# Patient Record
Sex: FEMALE | Race: WHITE | NOT HISPANIC OR LATINO | Employment: OTHER | URBAN - METROPOLITAN AREA
[De-identification: names, ages, dates, MRNs, and addresses within clinical notes are randomized per-mention and may not be internally consistent; named-entity substitution may affect disease eponyms.]

---

## 2019-06-10 ENCOUNTER — TRANSFERRED RECORDS (OUTPATIENT)
Dept: HEALTH INFORMATION MANAGEMENT | Facility: CLINIC | Age: 74
End: 2019-06-10

## 2019-11-09 ENCOUNTER — HEALTH MAINTENANCE LETTER (OUTPATIENT)
Age: 74
End: 2019-11-09

## 2020-02-23 ENCOUNTER — HEALTH MAINTENANCE LETTER (OUTPATIENT)
Age: 75
End: 2020-02-23

## 2020-12-06 ENCOUNTER — HEALTH MAINTENANCE LETTER (OUTPATIENT)
Age: 75
End: 2020-12-06

## 2021-04-11 ENCOUNTER — HEALTH MAINTENANCE LETTER (OUTPATIENT)
Age: 76
End: 2021-04-11

## 2021-09-26 ENCOUNTER — HEALTH MAINTENANCE LETTER (OUTPATIENT)
Age: 76
End: 2021-09-26

## 2022-05-07 ENCOUNTER — HEALTH MAINTENANCE LETTER (OUTPATIENT)
Age: 77
End: 2022-05-07

## 2022-08-08 ENCOUNTER — TRANSFERRED RECORDS (OUTPATIENT)
Dept: HEALTH INFORMATION MANAGEMENT | Facility: CLINIC | Age: 77
End: 2022-08-08

## 2023-04-22 ENCOUNTER — HEALTH MAINTENANCE LETTER (OUTPATIENT)
Age: 78
End: 2023-04-22

## 2024-01-09 ENCOUNTER — OFFICE VISIT (OUTPATIENT)
Dept: INTERNAL MEDICINE | Facility: CLINIC | Age: 79
End: 2024-01-09
Payer: COMMERCIAL

## 2024-01-09 VITALS
HEIGHT: 65 IN | WEIGHT: 170 LBS | OXYGEN SATURATION: 96 % | HEART RATE: 74 BPM | TEMPERATURE: 98.2 F | SYSTOLIC BLOOD PRESSURE: 118 MMHG | BODY MASS INDEX: 28.32 KG/M2 | RESPIRATION RATE: 16 BRPM | DIASTOLIC BLOOD PRESSURE: 66 MMHG

## 2024-01-09 DIAGNOSIS — J45.20 MILD INTERMITTENT ASTHMA, UNSPECIFIED WHETHER COMPLICATED: Primary | ICD-10-CM

## 2024-01-09 DIAGNOSIS — K21.00 GASTROESOPHAGEAL REFLUX DISEASE WITH ESOPHAGITIS WITHOUT HEMORRHAGE: ICD-10-CM

## 2024-01-09 DIAGNOSIS — E78.5 HYPERLIPIDEMIA LDL GOAL <130: ICD-10-CM

## 2024-01-09 PROCEDURE — 99203 OFFICE O/P NEW LOW 30 MIN: CPT | Performed by: INTERNAL MEDICINE

## 2024-01-09 RX ORDER — FLUTICASONE PROPIONATE 110 UG/1
1 AEROSOL, METERED RESPIRATORY (INHALATION) 2 TIMES DAILY
COMMUNITY
Start: 2024-01-09

## 2024-01-09 RX ORDER — ROSUVASTATIN CALCIUM 10 MG/1
10 TABLET, COATED ORAL DAILY
COMMUNITY
Start: 2024-01-09 | End: 2024-08-21

## 2024-01-09 ASSESSMENT — ASTHMA QUESTIONNAIRES
QUESTION_4 LAST FOUR WEEKS HOW OFTEN HAVE YOU USED YOUR RESCUE INHALER OR NEBULIZER MEDICATION (SUCH AS ALBUTEROL): NOT AT ALL
QUESTION_3 LAST FOUR WEEKS HOW OFTEN DID YOUR ASTHMA SYMPTOMS (WHEEZING, COUGHING, SHORTNESS OF BREATH, CHEST TIGHTNESS OR PAIN) WAKE YOU UP AT NIGHT OR EARLIER THAN USUAL IN THE MORNING: NOT AT ALL
QUESTION_2 LAST FOUR WEEKS HOW OFTEN HAVE YOU HAD SHORTNESS OF BREATH: NOT AT ALL
QUESTION_1 LAST FOUR WEEKS HOW MUCH OF THE TIME DID YOUR ASTHMA KEEP YOU FROM GETTING AS MUCH DONE AT WORK, SCHOOL OR AT HOME: NONE OF THE TIME
ACT_TOTALSCORE: 24
ACT_TOTALSCORE: 24
QUESTION_5 LAST FOUR WEEKS HOW WOULD YOU RATE YOUR ASTHMA CONTROL: WELL CONTROLLED

## 2024-01-09 NOTE — PROGRESS NOTES
"  Assessment & Plan   Problem List Items Addressed This Visit       Asthma, mild intermittent - Primary    Relevant Medications    fluticasone (FLOVENT HFA) 110 MCG/ACT inhaler    Hyperlipidemia LDL goal <130    Relevant Medications    rosuvastatin (CRESTOR) 10 MG tablet     Other Visit Diagnoses       Gastroesophageal reflux disease with esophagitis without hemorrhage        Relevant Medications    fluticasone (FLOVENT HFA) 110 MCG/ACT inhaler    omeprazole (PRILOSEC) 20 MG DR capsule           Patient is here to establish care.  She used to live in [place] but her and her  moved to an apartment in [place] to be close to their children.  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    Has a history of chronic back pain that got better with an ablation on the right side.    Reflux disease she is on Prilosec but we discussed trying to go off of this due to the risks.    Osteoporosis she has been on Fosamax and is now on holiday.  Just stopped this in 2022.    Hyperlipidemia on Crestor doing well    Mild intermittent asthma on Flovent.  She says she normally gets bronchitis once a year and needs prednisone.  She can try increase her Flovent if she starts to get sick and then see us if she needs prednisone.    Immunizations are up-to-date    No longer doing mammograms or colonoscopies at the age of 78.    Follow-up for an annual wellness exam in September.           BMI:   Estimated body mass index is 28.07 kg/m  as calculated from the following:    Height as of this encounter: 1.657 m (5' 5.25\").    Weight as of this encounter: 77.1 kg (170 lb).           Amarjit Ball MD  [facility]    Mary Lou Phelps is a 78 year old, presenting for the following health issues:  Establish Care      1/9/2024    10:49 AM   Additional Questions   Roomed by Lacey     History of Present Illness       Reason for visit:  Establish care    She eats 0-1 " "servings of fruits and vegetables daily.She consumes 1 sweetened beverage(s) daily.She exercises with enough effort to increase her heart rate 10 to 19 minutes per day.  She exercises with enough effort to increase her heart rate 3 or less days per week.   She is taking medications regularly.     Moved to town August 1st, sold their house in [facility] by the [employer],   Just retired from [employer], got bored so now volunteering here.      Health is pretty good,     Bronchitis, and intermittent Asthma, been good lately.  Uses Flovent daily and no need for rescue    GERD and on Omeprazole,     Osteoporosis in her hip.  Had fosamax for 3 years and no change. Stopped 2022, holiday period.     Chronic back pains and had ablation. Much improved, \"miracle\" per her.       Review of Systems         Objective    /66   Pulse 74   Temp 98.2  F (36.8  C) (Temporal)   Resp 16   Ht 1.657 m (5' 5.25\")   Wt 77.1 kg (170 lb)   SpO2 96%   BMI 28.07 kg/m    Body mass index is 28.07 kg/m .  Physical Exam   GENERAL: healthy, alert and no distress  NECK: no adenopathy, no asymmetry, masses, or scars and thyroid normal to palpation  RESP: lungs clear to auscultation - no rales, rhonchi or wheezes  CV: regular rate and rhythm, normal S1 S2, no S3 or S4, no murmur, click or rub, no peripheral edema and peripheral pulses strong  ABDOMEN: soft, nontender, no hepatosplenomegaly, no masses and bowel sounds normal  MS: no gross musculoskeletal defects noted, no edema                      "

## 2024-08-20 SDOH — HEALTH STABILITY: PHYSICAL HEALTH: ON AVERAGE, HOW MANY DAYS PER WEEK DO YOU ENGAGE IN MODERATE TO STRENUOUS EXERCISE (LIKE A BRISK WALK)?: 2 DAYS

## 2024-08-20 SDOH — HEALTH STABILITY: PHYSICAL HEALTH: ON AVERAGE, HOW MANY MINUTES DO YOU ENGAGE IN EXERCISE AT THIS LEVEL?: 30 MIN

## 2024-08-20 ASSESSMENT — ASTHMA QUESTIONNAIRES
ACT_TOTALSCORE: 24
QUESTION_4 LAST FOUR WEEKS HOW OFTEN HAVE YOU USED YOUR RESCUE INHALER OR NEBULIZER MEDICATION (SUCH AS ALBUTEROL): NOT AT ALL
QUESTION_5 LAST FOUR WEEKS HOW WOULD YOU RATE YOUR ASTHMA CONTROL: WELL CONTROLLED
ACT_TOTALSCORE: 24
QUESTION_3 LAST FOUR WEEKS HOW OFTEN DID YOUR ASTHMA SYMPTOMS (WHEEZING, COUGHING, SHORTNESS OF BREATH, CHEST TIGHTNESS OR PAIN) WAKE YOU UP AT NIGHT OR EARLIER THAN USUAL IN THE MORNING: NOT AT ALL
QUESTION_2 LAST FOUR WEEKS HOW OFTEN HAVE YOU HAD SHORTNESS OF BREATH: NOT AT ALL
QUESTION_1 LAST FOUR WEEKS HOW MUCH OF THE TIME DID YOUR ASTHMA KEEP YOU FROM GETTING AS MUCH DONE AT WORK, SCHOOL OR AT HOME: NONE OF THE TIME

## 2024-08-20 ASSESSMENT — SOCIAL DETERMINANTS OF HEALTH (SDOH): HOW OFTEN DO YOU GET TOGETHER WITH FRIENDS OR RELATIVES?: ONCE A WEEK

## 2024-08-21 ENCOUNTER — OFFICE VISIT (OUTPATIENT)
Dept: INTERNAL MEDICINE | Facility: CLINIC | Age: 79
End: 2024-08-21
Payer: COMMERCIAL

## 2024-08-21 VITALS
HEART RATE: 64 BPM | RESPIRATION RATE: 12 BRPM | SYSTOLIC BLOOD PRESSURE: 120 MMHG | HEIGHT: 65 IN | WEIGHT: 169.3 LBS | BODY MASS INDEX: 28.21 KG/M2 | TEMPERATURE: 96.9 F | DIASTOLIC BLOOD PRESSURE: 70 MMHG | OXYGEN SATURATION: 96 %

## 2024-08-21 DIAGNOSIS — Z12.31 ENCOUNTER FOR SCREENING MAMMOGRAM FOR BREAST CANCER: ICD-10-CM

## 2024-08-21 DIAGNOSIS — E78.5 HYPERLIPIDEMIA LDL GOAL <130: ICD-10-CM

## 2024-08-21 DIAGNOSIS — J45.20 MILD INTERMITTENT ASTHMA, UNSPECIFIED WHETHER COMPLICATED: ICD-10-CM

## 2024-08-21 DIAGNOSIS — Z00.00 MEDICARE ANNUAL WELLNESS VISIT, SUBSEQUENT: Primary | ICD-10-CM

## 2024-08-21 DIAGNOSIS — K21.00 GASTROESOPHAGEAL REFLUX DISEASE WITH ESOPHAGITIS WITHOUT HEMORRHAGE: ICD-10-CM

## 2024-08-21 DIAGNOSIS — G47.00 INSOMNIA, UNSPECIFIED TYPE: ICD-10-CM

## 2024-08-21 LAB
ALBUMIN SERPL BCG-MCNC: 4.4 G/DL (ref 3.5–5.2)
ALP SERPL-CCNC: 116 U/L (ref 40–150)
ALT SERPL W P-5'-P-CCNC: 19 U/L (ref 0–50)
ANION GAP SERPL CALCULATED.3IONS-SCNC: 10 MMOL/L (ref 7–15)
AST SERPL W P-5'-P-CCNC: 19 U/L (ref 0–45)
BILIRUB SERPL-MCNC: 0.6 MG/DL
BUN SERPL-MCNC: 15.5 MG/DL (ref 8–23)
CALCIUM SERPL-MCNC: 9.6 MG/DL (ref 8.8–10.4)
CHLORIDE SERPL-SCNC: 104 MMOL/L (ref 98–107)
CHOLEST SERPL-MCNC: 169 MG/DL
CREAT SERPL-MCNC: 0.77 MG/DL (ref 0.51–0.95)
EGFRCR SERPLBLD CKD-EPI 2021: 78 ML/MIN/1.73M2
FASTING STATUS PATIENT QL REPORTED: YES
FASTING STATUS PATIENT QL REPORTED: YES
GLUCOSE SERPL-MCNC: 90 MG/DL (ref 70–99)
HCO3 SERPL-SCNC: 26 MMOL/L (ref 22–29)
HDLC SERPL-MCNC: 56 MG/DL
LDLC SERPL CALC-MCNC: 86 MG/DL
NONHDLC SERPL-MCNC: 113 MG/DL
POTASSIUM SERPL-SCNC: 4.1 MMOL/L (ref 3.4–5.3)
PROT SERPL-MCNC: 6.9 G/DL (ref 6.4–8.3)
SODIUM SERPL-SCNC: 140 MMOL/L (ref 135–145)
TRIGL SERPL-MCNC: 135 MG/DL

## 2024-08-21 PROCEDURE — 36415 COLL VENOUS BLD VENIPUNCTURE: CPT | Performed by: INTERNAL MEDICINE

## 2024-08-21 PROCEDURE — 80053 COMPREHEN METABOLIC PANEL: CPT | Performed by: INTERNAL MEDICINE

## 2024-08-21 PROCEDURE — 99397 PER PM REEVAL EST PAT 65+ YR: CPT | Performed by: INTERNAL MEDICINE

## 2024-08-21 PROCEDURE — 99214 OFFICE O/P EST MOD 30 MIN: CPT | Mod: 25 | Performed by: INTERNAL MEDICINE

## 2024-08-21 PROCEDURE — 80061 LIPID PANEL: CPT | Performed by: INTERNAL MEDICINE

## 2024-08-21 RX ORDER — TRAZODONE HYDROCHLORIDE 50 MG/1
50 TABLET, FILM COATED ORAL AT BEDTIME
Qty: 90 TABLET | Refills: 3 | Status: SHIPPED | OUTPATIENT
Start: 2024-08-21

## 2024-08-21 RX ORDER — ROSUVASTATIN CALCIUM 10 MG/1
10 TABLET, COATED ORAL DAILY
Qty: 90 TABLET | Refills: 3 | Status: SHIPPED | OUTPATIENT
Start: 2024-08-21

## 2024-08-21 ASSESSMENT — PAIN SCALES - GENERAL: PAINLEVEL: NO PAIN (0)

## 2024-08-21 NOTE — PROGRESS NOTES
"Preventive Care Visit  [facility]  Amarjit Ball MD, Internal Medicine  Aug 21, 2024      Assessment & Plan   Problem List Items Addressed This Visit       Asthma, mild intermittent    Hyperlipidemia LDL goal <130    Relevant Medications    rosuvastatin (CRESTOR) 10 MG tablet    Other Relevant Orders    Comprehensive metabolic panel (BMP + Alb, Alk Phos, ALT, AST, Total. Bili, TP)    Lipid panel reflex to direct LDL Fasting     Other Visit Diagnoses       Medicare annual wellness visit, subsequent    -  Primary    Gastroesophageal reflux disease with esophagitis without hemorrhage        Relevant Medications    omeprazole (PRILOSEC) 20 MG DR capsule    Insomnia, unspecified type        Relevant Medications    traZODone (DESYREL) 50 MG tablet    Encounter for screening mammogram for breast cancer        Relevant Orders    MA Screen Bilateral w/Keyur           She is here for Medicare wellness exam.  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  No longer needing colonoscopies, we will order mammogram  She will get a flu shot COVID shot and shingles shot this fall.  Memory is good  Walking is good.    Other issues addressed reflux disease cause a little bit of a cough she will go back on omeprazole and a refill is done.  Hyperlipidemia will check lipids and order her Crestor for her     new issue is insomnia with some mind racing at night difficult to sleep we will try her on trazodone.  Discussed the medication, side effects and how to take it.    Asthma is stable not needing Flovent or albuterol but has them if needed when she gets sick.      Patient has been advised of split billing requirements and indicates understanding: Yes       BMI  Estimated body mass index is 28 kg/m  as calculated from the following:    Height as of this encounter: 1.656 m (5' 5.2\").    Weight as of this encounter: 76.8 kg (169 lb 4.8 oz). "       Counseling  Appropriate preventive services were addressed with this patient via screening, questionnaire, or discussion as appropriate for fall prevention, nutrition, physical activity, Tobacco-use cessation, social engagement, weight loss and cognition.  Checklist reviewing preventive services available has been given to the patient.  Reviewed patient's diet, addressing concerns and/or questions.   She is at risk for lack of exercise and has been provided with information to increase physical activity for the benefit of her well-being.     FUTURE APPOINTMENTS:       - Follow-up for annual visit or as needed      Subjective   Mattie is a 78 year old, presenting for the following:  Physical        8/21/2024     9:35 AM   Additional Questions   Roomed by Florentin MARLEY        Health Care Directive  Patient does not have a Health Care Directive or Living Will: Discussed advance care planning with patient; information given to patient to review.    HPI  Doing well, likes to get outside, volunteers.     Doing pretty well. Off omeprazole, no heartburn symptoms.  Has a cough again will restart it.    Stopped gabapentin after her back injection is better now.     Hard to fall asleep mind is racing at night.   with health issues, worries about him at night. Not getting 8 hours of sleep, average of 6.     Not wanting another colonoscopy, will do a mammogram    History of psoriasis, uses lotion,     Exercises with walking.         8/20/2024   General Health   How would you rate your overall physical health? Good   Feel stress (tense, anxious, or unable to sleep) To some extent      (!) STRESS CONCERN      8/20/2024   Nutrition   Diet: Regular (no restrictions)            8/20/2024   Exercise   Days per week of moderate/strenous exercise 2 days   Average minutes spent exercising at this level 30 min      (!) EXERCISE CONCERN      8/20/2024   Social Factors   Frequency of gathering with friends or relatives Once a week    Worry food won't last until get money to buy more No   Food not last or not have enough money for food? No   Do you have housing? (Housing is defined as stable permanent housing and does not include staying ouside in a car, in a tent, in an abandoned building, in an overnight shelter, or couch-surfing.) Yes   Are you worried about losing your housing? No   Lack of transportation? No   Unable to get utilities (heat,electricity)? No            8/20/2024   Fall Risk   Fallen 2 or more times in the past year? No   Trouble with walking or balance? No             8/20/2024   Activities of Daily Living- Home Safety   Needs help with the following daily activites None of the above   Safety concerns in the home None of the above            8/20/2024   Dental   Dentist two times every year? Yes            8/20/2024   Hearing Screening   Hearing concerns? None of the above            8/20/2024   Driving Risk Screening   Patient/family members have concerns about driving No            8/20/2024   General Alertness/Fatigue Screening   Have you been more tired than usual lately? No            8/20/2024   Urinary Incontinence Screening   Bothered by leaking urine in past 6 months No            8/20/2024   TB Screening   Were you born outside of the [place]? No          Today's PHQ-2 Score:       1/9/2024    10:44 AM   PHQ-2 ( 1999 Pf[employer])   Q1: Little interest or pleasure in doing things 0   Q2: Feeling down, depressed or hopeless 0   PHQ-2 Score 0   Q1: Little interest or pleasure in doing things Not at all   Q2: Feeling down, depressed or hopeless Not at all   PHQ-2 Score 0         8/20/2024   Substance Use   Alcohol more than 3/day or more than 7/wk No   Do you have a current opioid prescription? No   How severe/bad is pain from 1 to 10? 0/10 (No Pain)   Do you use any other substances recreationally? (!) OTHER        Social History     Tobacco Use    Smoking status: Never    Smokeless tobacco: Never   Substance Use Topics     Alcohol use: Yes     Comment: occ    Drug use: No      Mammogram Screening - After age 74- determine frequency with patient based on health status, life expectancy and patient goals  Reviewed and updated as needed this visit by Provider                    Lab work is in process  Current providers sharing in care for this patient include:  Patient Care Team:  Amarjit Ball MD as PCP - General (Internal Medicine)  Amarjit Ball MD as Assigned PCP    The following health maintenance items are reviewed in Epic and correct as of today:  Health Maintenance   Topic Date Due    GLUCOSE  Never done    RSV VACCINE (Pregnancy & 60+) (1 - 1-dose 60+ series) Never done    ASTHMA ACTION PLAN  11/03/2009    LIPID  10/08/2013    ZOSTER IMMUNIZATION (2 of 3) 06/01/2015    COVID-19 Vaccine (6 - 2023-24 season) 02/03/2024    MEDICARE ANNUAL WELLNESS VISIT  08/16/2024    INFLUENZA VACCINE (1) 09/01/2024    ANNUAL REVIEW OF HM ORDERS  01/09/2025    ASTHMA CONTROL TEST  02/21/2025    DEXA  06/22/2025    FALL RISK ASSESSMENT  08/21/2025    DTAP/TDAP/TD IMMUNIZATION (4 - Td or Tdap) 04/19/2027    ADVANCE CARE PLANNING  08/21/2029    PHQ-2 (once per calendar year)  Completed    Pneumococcal Vaccine: 65+ Years  Completed    HPV IMMUNIZATION  Aged Out    MENINGITIS IMMUNIZATION  Aged Out    RSV MONOCLONAL ANTIBODY  Aged Out    COLORECTAL CANCER SCREENING  Discontinued         Review of Systems  CONSTITUTIONAL: NEGATIVE for fever, chills, change in weight  INTEGUMENTARY/SKIN: NEGATIVE for worrisome rashes, moles or lesions  EYES: NEGATIVE for vision changes or irritation  ENT/MOUTH: NEGATIVE for ear, mouth and throat problems  RESP: NEGATIVE for significant cough or SOB  BREAST: NEGATIVE for masses, tenderness or discharge  CV: NEGATIVE for chest pain, palpitations or peripheral edema  GI: NEGATIVE for nausea, abdominal pain, heartburn, or change in bowel habits  : NEGATIVE for frequency, dysuria, or hematuria  MUSCULOSKELETAL: NEGATIVE  "for significant arthralgias or myalgia  NEURO: NEGATIVE for weakness, dizziness or paresthesias  ENDOCRINE: NEGATIVE for temperature intolerance, skin/hair changes  HEME: NEGATIVE for bleeding problems  PSYCHIATRIC: NEGATIVE for changes in mood or affect     Objective    Exam  /70   Pulse 64   Temp 96.9  F (36.1  C) (Temporal)   Resp 12   Ht 1.656 m (5' 5.2\")   Wt 76.8 kg (169 lb 4.8 oz)   SpO2 96%   BMI 28.00 kg/m     Estimated body mass index is 28 kg/m  as calculated from the following:    Height as of this encounter: 1.656 m (5' 5.2\").    Weight as of this encounter: 76.8 kg (169 lb 4.8 oz).    Physical Exam  GENERAL: alert and no distress  EYES: Eyes grossly normal to inspection, PERRL and conjunctivae and sclerae normal  HENT: ear canals and TM's normal, nose and mouth without ulcers or lesions  NECK: no adenopathy, no asymmetry, masses, or scars  RESP: lungs clear to auscultation - no rales, rhonchi or wheezes  CV: regular rate and rhythm, normal S1 S2, no S3 or S4, no murmur, click or rub, no peripheral edema  ABDOMEN: soft, nontender, no hepatosplenomegaly, no masses and bowel sounds normal  MS: no gross musculoskeletal defects noted, no edema  SKIN: no suspicious lesions or rashes  NEURO: Normal strength and tone, mentation intact and speech normal  PSYCH: mentation appears normal, affect normal/bright         8/21/2024   Mini Cog   Clock Draw Score 2 Normal   3 Item Recall 3 objects recalled   Mini Cog Total Score 5          Signed Electronically by: Amarjit Ball MD  "

## 2024-11-12 ENCOUNTER — HOSPITAL ENCOUNTER (OUTPATIENT)
Dept: MAMMOGRAPHY | Facility: CLINIC | Age: 79
Discharge: HOME | End: 2024-11-12
Attending: INTERNAL MEDICINE | Admitting: INTERNAL MEDICINE
Payer: MEDICARE

## 2024-11-12 DIAGNOSIS — Z12.31 ENCOUNTER FOR SCREENING MAMMOGRAM FOR BREAST CANCER: ICD-10-CM

## 2024-11-12 PROCEDURE — 77063 BREAST TOMOSYNTHESIS BI: CPT

## 2024-11-29 ENCOUNTER — HOSPITAL ENCOUNTER (OUTPATIENT)
Dept: GENERAL RADIOLOGY | Facility: CLINIC | Age: 79
Discharge: HOME | End: 2024-11-29
Attending: FAMILY MEDICINE | Admitting: FAMILY MEDICINE
Payer: MEDICARE

## 2024-11-29 ENCOUNTER — OFFICE VISIT (OUTPATIENT)
Dept: FAMILY MEDICINE | Facility: CLINIC | Age: 79
End: 2024-11-29
Payer: COMMERCIAL

## 2024-11-29 VITALS
TEMPERATURE: 97.2 F | HEIGHT: 66 IN | BODY MASS INDEX: 27.59 KG/M2 | DIASTOLIC BLOOD PRESSURE: 72 MMHG | HEART RATE: 64 BPM | WEIGHT: 171.7 LBS | OXYGEN SATURATION: 95 % | RESPIRATION RATE: 14 BRPM | SYSTOLIC BLOOD PRESSURE: 115 MMHG

## 2024-11-29 DIAGNOSIS — J40 BRONCHITIS: ICD-10-CM

## 2024-11-29 DIAGNOSIS — J45.41 MODERATE PERSISTENT ASTHMA WITH EXACERBATION: ICD-10-CM

## 2024-11-29 DIAGNOSIS — R05.1 ACUTE COUGH: ICD-10-CM

## 2024-11-29 DIAGNOSIS — R05.1 ACUTE COUGH: Primary | ICD-10-CM

## 2024-11-29 PROCEDURE — G2211 COMPLEX E/M VISIT ADD ON: HCPCS | Performed by: FAMILY MEDICINE

## 2024-11-29 PROCEDURE — 99214 OFFICE O/P EST MOD 30 MIN: CPT | Performed by: FAMILY MEDICINE

## 2024-11-29 PROCEDURE — 71046 X-RAY EXAM CHEST 2 VIEWS: CPT

## 2024-11-29 RX ORDER — PREDNISONE 20 MG/1
TABLET ORAL
Qty: 20 TABLET | Refills: 0 | Status: SHIPPED | OUTPATIENT
Start: 2024-11-29

## 2024-11-29 RX ORDER — AZITHROMYCIN 250 MG/1
TABLET, FILM COATED ORAL
Qty: 6 TABLET | Refills: 0 | Status: SHIPPED | OUTPATIENT
Start: 2024-11-29 | End: 2024-12-03

## 2024-11-29 ASSESSMENT — PAIN SCALES - GENERAL: PAINLEVEL_OUTOF10: NO PAIN (0)

## 2024-11-29 NOTE — PROGRESS NOTES
Assessment & Plan     Acute cough  Mattie Andrew is an 79-year-old female patient of Dr.Paul MARTIN Ball who presents to clinic today with 1 to 2-week history of upper respiratory symptoms.  Symptoms initially began in her upper chest.  She denies any nasal symptoms of sinus congestion, purulent rhinitis, ear pain or sore throat.  Symptoms began in the chest with a tickly productive cough of whitish sputum.  She has had no fever.  She does have a history of asthma for which she uses Flovent 1 puff twice daily and albuterol which she has been using every 4 hours.  She has occasional episodes of asthma exacerbations.  She has been using over-the-counter mucolytic's such as Mucinex and Robitussin.  She has not been using any decongestants.    She is up-to-date with all immunizations including COVID and influenza vaccine (and.  She did test for COVID at home and it was negative.    On exam she is a pleasant elderly female in no acute distress.  Her blood pressure is 115/72.  Her pulse is 64 and regular.  She is afebrile.  Respiratory rate is 14 and unlabored with oxygen saturations of 95% on room air.    HEENT exam: Normocephalic atraumatic.  Pupils are equally round reactive light accommodation.  TMs are clear bilaterally.  Nasopharynx is clear.  Sinuses nontender with palpation.  Oropharynx is clear without erythema.  Neck is supple without adenopathy.  Lungs shows occasional scattered rhonchi but no wheezing rales heard.  Cardiac exam is regular.  Abdomen is benign.    Chest x-ray:  Results for orders placed or performed during the hospital encounter of 11/29/24   XR Chest 2 Views     Status: None    Narrative    CHEST TWO VIEWS 11/29/2024 3:21 PM    HISTORY: Acute cough.    COMPARISON: None.    FINDINGS/    Impression    IMPRESSION: A subcentimeter increased density nodule in the right  upper lobe is suggestive of a pulmonary granuloma. If further  evaluation is desired, consider follow-up chest CT. No  airspace  consolidation, pneumothorax, or pleural fluid. Heart size and  pulmonary vasculature are normal appearing. No acute osseous  abnormality.         PETROS KEBEDE MD         SYSTEM ID:  [id number]         Assessment: 79-year-old female with with 1 to 2-week history of upper respiratory symptoms of cough productive of sputum and dyspnea.  She has a history of asthma for which she is on Flovent and albuterol inhaler.  No infiltrate noted on chest x-ray.  She does have a small which appears to be a calcified granuloma in the right upper lobe.  This will need follow-up.    Plan: Will start her on a prednisone burst and taper as well as azithromycin Z-Cornelius for 5 days.  Encourage plenty of fluids.  She will continue with her Flovent 1 puff twice daily and can increase her albuterol to 2 puffs every 4 hours and 2 hours as needed.  Like her to follow-up with her PCP in 1 to 2 weeks if symptoms have not improved.  - XR Chest 2 Views; Future    Bronchitis  Assessment: 79-year-old female with with 1 to 2-week history of upper respiratory symptoms of cough productive of sputum and dyspnea.  She has a history of asthma for which she is on Flovent and albuterol inhaler.  No infiltrate noted on chest x-ray.  She does have a small which appears to be a calcified granuloma in the right upper lobe.  This will need follow-up.    Plan: Will start her on a prednisone burst and taper as well as azithromycin Z-Cornelius for 5 days.  Encourage plenty of fluids.  She will continue with her Flovent 1 puff twice daily and can increase her albuterol to 2 puffs every 4 hours and 2 hours as needed.  Like her to follow-up with her PCP in 1 to 2 weeks if symptoms have not improved.  - azithromycin (ZITHROMAX) 250 MG tablet; Take 2 tablets (500 mg) by mouth daily for 1 day, THEN 1 tablet (250 mg) daily for 4 days.    Moderate persistent asthma with exacerbation  Assessment: 79-year-old female with with 1 to 2-week history of upper respiratory  symptoms of cough productive of sputum and dyspnea.  She has a history of asthma for which she is on Flovent and albuterol inhaler.  No infiltrate noted on chest x-ray.  She does have a small which appears to be a calcified granuloma in the right upper lobe.  This will need follow-up.    Plan: Will start her on a prednisone burst and taper as well as azithromycin Z-Cornelius for 5 days.  Encourage plenty of fluids.  She will continue with her Flovent 1 puff twice daily and can increase her albuterol to 2 puffs every 4 hours and 2 hours as needed.  Like her to follow-up with her PCP in 1 to 2 weeks if symptoms have not improved.  - predniSONE (DELTASONE) 20 MG tablet; Take 3 tabs by mouth daily x 3 days, then 2 tabs daily x 3 days, then 1 tab daily x 3 days, then 1/2 tab daily x 3 days.            FUTURE APPOINTMENTS:       - Follow-up for annual visit or as needed    Subjective   Mattie is a 79 year old, presenting for the following health issues:  Cold Symptoms        11/29/2024     2:43 PM   Additional Questions   Roomed by Fermín     History of Present Illness       Reason for visit:  Cold symptoms  Symptom onset:  1-2 weeks ago  Symptoms include:  Burning sensation in chest, cough, shortness of breath, fatigue  Symptom intensity:  Moderate  Symptom progression:  Worsening  Had these symptoms before:  Yes   She is taking medications regularly.         Patient Active Problem List   Diagnosis    Asthma, mild intermittent    Osteoarthritis    Hyperlipidemia LDL goal <130    Overweight (BMI 25.0-29.9)     Current Outpatient Medications   Medication Sig Dispense Refill    albuterol (PROAIR HFA) 108 (90 BASE) MCG/ACT inhaler Inhale 1-2 puffs into the lungs every 4 hours as needed for shortness of breath / dyspnea. 1 Inhaler 3    aspirin 81 MG EC tablet Take 1 tablet by mouth daily.      fluticasone (FLOVENT HFA) 110 MCG/ACT inhaler Inhale 1 puff into the lungs 2 times daily      omeprazole (PRILOSEC) 20 MG DR capsule Take 1  "capsule (20 mg) by mouth daily. 90 capsule 3    rosuvastatin (CRESTOR) 10 MG tablet Take 1 tablet (10 mg) by mouth daily. 90 tablet 3    traZODone (DESYREL) 50 MG tablet Take 1 tablet (50 mg) by mouth at bedtime. 90 tablet 3       Asthma Follow-Up    Was ACT completed today?  Yes        8/20/2024     2:48 PM   ACT Total Scores   ACT TOTAL SCORE (Goal Greater than or Equal to 20) 24   In the past 12 months, how many times did you visit the emergency room for your asthma without being admitted to the hospital? 0    In the past 12 months, how many times were you hospitalized overnight because of your asthma? 0        Patient-reported              Review of Systems  CONSTITUTIONAL: NEGATIVE for fever, chills, change in weight  ENT/MOUTH: NEGATIVE for ear, mouth and throat problems  RESP:POSITIVE for cough-productive, Hx asthma, sputum white, and wheezing and NEGATIVE for hemoptysis, pleurisy, and SOB/dyspnea  CV: NEGATIVE for chest pain, palpitations or peripheral edema  ROS otherwise negative      Objective    /72 (BP Location: Left arm, Patient Position: Sitting, Cuff Size: Adult Regular)   Pulse 64   Temp 97.2  F (36.2  C) (Temporal)   Resp 14   Ht 1.68 m (5' 6.14\")   Wt 77.9 kg (171 lb 11.2 oz)   SpO2 95%   BMI 27.59 kg/m    Body mass index is 27.59 kg/m .  Physical Exam   GENERAL: alert and no distress  EYES: Eyes grossly normal to inspection, PERRL and conjunctivae and sclerae normal  HENT: ear canals and TM's normal, nose and mouth without ulcers or lesions  NECK: no adenopathy, no asymmetry, masses, or scars  RESP: lungs clear to auscultation - no rales, rhonchi or wheezes  CV: regular rate and rhythm, normal S1 S2, no S3 or S4, no murmur, click or rub, no peripheral edema  ABDOMEN: soft, nontender, no hepatosplenomegaly, no masses and bowel sounds normal  MS: no gross musculoskeletal defects noted, no edema    XR Chest 2 Views    Result Date: 11/29/2024  CHEST TWO VIEWS 11/29/2024 3:21 PM HISTORY: " Acute cough. COMPARISON: None. FINDINGS/    IMPRESSION: A subcentimeter increased density nodule in the right upper lobe is suggestive of a pulmonary granuloma. If further evaluation is desired, consider follow-up chest CT. No airspace consolidation, pneumothorax, or pleural fluid. Heart size and pulmonary vasculature are normal appearing. No acute osseous abnormality.  PETROS KEBEDE MD   SYSTEM ID:  [id number]         Signed Electronically by: Mack Loco MD

## 2024-12-09 ENCOUNTER — OFFICE VISIT (OUTPATIENT)
Dept: INTERNAL MEDICINE | Facility: CLINIC | Age: 79
End: 2024-12-09
Payer: COMMERCIAL

## 2024-12-09 ENCOUNTER — HOSPITAL ENCOUNTER (OUTPATIENT)
Dept: GENERAL RADIOLOGY | Facility: CLINIC | Age: 79
Discharge: HOME | End: 2024-12-09
Attending: INTERNAL MEDICINE | Admitting: INTERNAL MEDICINE
Payer: MEDICARE

## 2024-12-09 ENCOUNTER — NURSE TRIAGE (OUTPATIENT)
Dept: INTERNAL MEDICINE | Facility: CLINIC | Age: 79
End: 2024-12-09

## 2024-12-09 VITALS
SYSTOLIC BLOOD PRESSURE: 134 MMHG | HEART RATE: 70 BPM | RESPIRATION RATE: 14 BRPM | DIASTOLIC BLOOD PRESSURE: 74 MMHG | BODY MASS INDEX: 28.12 KG/M2 | TEMPERATURE: 98.6 F | WEIGHT: 168.8 LBS | HEIGHT: 65 IN | OXYGEN SATURATION: 95 %

## 2024-12-09 DIAGNOSIS — J45.41 MODERATE PERSISTENT ASTHMA WITH EXACERBATION: ICD-10-CM

## 2024-12-09 DIAGNOSIS — R05.2 SUBACUTE COUGH: Primary | ICD-10-CM

## 2024-12-09 DIAGNOSIS — R05.2 SUBACUTE COUGH: ICD-10-CM

## 2024-12-09 DIAGNOSIS — J45.20 MILD INTERMITTENT ASTHMA, UNSPECIFIED WHETHER COMPLICATED: ICD-10-CM

## 2024-12-09 PROCEDURE — 99214 OFFICE O/P EST MOD 30 MIN: CPT | Performed by: INTERNAL MEDICINE

## 2024-12-09 PROCEDURE — G2211 COMPLEX E/M VISIT ADD ON: HCPCS | Performed by: INTERNAL MEDICINE

## 2024-12-09 PROCEDURE — 71046 X-RAY EXAM CHEST 2 VIEWS: CPT

## 2024-12-09 RX ORDER — CEFDINIR 300 MG/1
300 CAPSULE ORAL 2 TIMES DAILY
Qty: 20 CAPSULE | Refills: 0 | Status: SHIPPED | OUTPATIENT
Start: 2024-12-09

## 2024-12-09 RX ORDER — PREDNISONE 20 MG/1
TABLET ORAL
Qty: 9 TABLET | Refills: 0 | Status: SHIPPED | OUTPATIENT
Start: 2024-12-09

## 2024-12-09 ASSESSMENT — PAIN SCALES - GENERAL: PAINLEVEL_OUTOF10: NO PAIN (0)

## 2024-12-09 ASSESSMENT — ENCOUNTER SYMPTOMS: COUGH: 1

## 2024-12-09 NOTE — PROGRESS NOTES
Assessment & Plan   Problem List Items Addressed This Visit       Asthma, mild intermittent     Other Visit Diagnoses       Subacute cough    -  Primary    Relevant Medications    cefdinir (OMNICEF) 300 MG capsule    Other Relevant Orders    XR Chest 2 Views    Moderate persistent asthma with exacerbation        Relevant Medications    predniSONE (DELTASONE) 20 MG tablet             Mattie is a 79 year old female who has a persistent cough that has been present for 3 weeks despite antibiotic use. Concerned for progression to pneumonia as her cough is still present and wet in nature. Other cause may include viral infection. Also concerned for sinus infection as Mattie has pressure overlying her sinuses. She does have chronic history of asthma and bronchitis. Discussed need for a second round of antibiotics due to persistence of cough and sinus symptoms. Cefdinir course started for 10 days. Repeat chest x-ray also ordered to assess for consolidation in the lungs. Also will do some more prednisone to help breathing an asthma.     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    Subjective   Mattie is a 79 year old, presenting for the following health issues:  Cough        12/9/2024    10:10 AM   Additional Questions   Roomed by Vaibhav     History of Present Illness       Reason for visit:  Cough for a week, bronchial or asrhma  Symptom onset:  3-4 weeks ago  Symptoms include:  Cough, sinus headache  Symptom intensity:  Moderate  Symptom progression:  Worsening  Had these symptoms before:  Yes  Has tried/received treatment for these symptoms:  Yes  Previous treatment was successful:  No  What makes it worse:  Cough and head cold, lack of sleep  What makes it better:  Hot liquids   She is taking medications regularly.       Mattie presents today for a cough that has been present for 3 weeks.  She also has sinus pressure that brought her in today.  This morning she had  "30 to 45 minutes of green discharge from her nose with some blood after blowing her nose, but no clots.  States the cough has been causing her to decrease her activity due to increase coughing bouts with movement.  She has had no fever, chest pain, sick contacts, she has not traveled, she is not not coughing up blood or experiencing weight loss.  Does endorse some white-yellow plaques on her tongue that are not removable with brushing her teeth.  She also has some bumps on her inner lips that are not painful but are new.  She continues to feel worn down.  She recently finished a Z-Cornelius and found it helpful but her symptoms continue. She is on the last 2 days of a prednisone taper.  No other concerns today.      ROS negative except those listed above.      Objective    /74   Pulse 70   Temp 98.6  F (37  C) (Oral)   Resp 14   Ht 1.66 m (5' 5.35\")   Wt 76.6 kg (168 lb 12.8 oz)   SpO2 95%   BMI 27.79 kg/m    Body mass index is 27.79 kg/m .  Physical Exam   GENERAL: alert and no distress  HENT: ear canals and TM's normal, nose and mouth without ulcers or lesions  RESP: lungs clear to auscultation - no rales, rhonchi or wheezes  CV: regular rate and rhythm, normal S1 S2, no S3 or S4, no murmur, click or rub, no peripheral edema    No results found for this visit on 12/09/24.      Aj Suero, MS3  Medical Student      I was present with the medical student who participated in the service and in the documentation of the note. I have verified the history and personally performed the physical exam and medical decision making. I reviewed the note in detail and edited it appropriately. I agree with the assessment and plan of care as documented in the note.    The longitudinal plan of care for the diagnosis(es)/condition(s) as documented were addressed during this visit. Due to the added complexity in care, I will continue to support Mattie in the subsequent management and with ongoing continuity of " care.      Signed Electronically by: Amarjit Ball MD

## 2024-12-09 NOTE — TELEPHONE ENCOUNTER
"Nurse Triage SBAR    Is this a 2nd Level Triage? YES, LICENSED PRACTITIONER REVIEW IS REQUIRED    Situation: Patient calling and reports she has a sinus infection. Patient states she was seen in clinic about a week ago with Dr Loco for a cough and was prescribed antibiotic and prednisone for cough. Patient told to call providers nurse if not feeling better or needing appointment. Patient states her cough is a little better, looser - however, patient states she has pain in her sinuses, eyes, and bridge of nose. Rates pain moderate to severe. Patient reports green, yellow, bloody discharge. Patient states she is using saline spray in her nose. She can breathe through nose after blowing it. Patient reports \"thrushy\" like tongue, states its yellow/white. She has white bumps on inside of lower lip. Patient states she thought she had a \"low grade\" fever yesterday but is not really sure because she was drinking a lot of hot tea. Denies fever today. Denies difficulty breathing or SOB. Patient states she took covid test and it was negative.    Background: history of sinus infections    Assessment: A/O. Patient sounds congested on phone.    Protocol Recommended Disposition:   No disposition on file. - See in office or video visit today or tomorrow    Recommendation: per protocol see in office today or tomorrow. Patient is able to come in today if needed but would prefer if provider could prescribe z-pack and short-dose prednisone for symptoms.     Patient does not have ability to do virtual video visit but can do phone visit if needed.     Routed to provider    Does the patient meet one of the following criteria for ADS visit consideration? No    Juani Dowling RN on 12/9/2024 at 8:20 AM      Additional Information   Negative: Sounds like a life-threatening emergency to the triager   Negative: Difficulty breathing, and not from stuffy nose (e.g., not relieved by cleaning out the nose)   Negative: SEVERE headache and " "has fever   Negative: Patient sounds very sick or weak to the triager   Negative: SEVERE sinus pain   Negative: Severe headache   Negative: Redness or swelling on the cheek, forehead, or around the eye   Negative: Fever > 103 F (39.4 C)   Negative: Fever > 101 F (38.3 C) and over 60 years of age   Negative: Fever > 100.0 F (37.8 C) and has diabetes mellitus or a weak immune system (e.g., HIV positive, cancer chemotherapy, organ transplant, splenectomy, chronic steroids)   Negative: Fever > 100.0 F (37.8 C) and bedridden (e.g., nursing home patient, stroke, chronic illness, recovering from surgery)   Negative: Fever present > 3 days (72 hours)   Negative: Fever returns after gone for over 24 hours and symptoms worse or not improved   Negative: Sinus pain (not just congestion) and fever   Negative: Earache   Sinus congestion (pressure, fullness) present > 10 days    Answer Assessment - Initial Assessment Questions  1. LOCATION: \"Where does it hurt?\"       Sinus, eyes, bridge of nose  2. ONSET: \"When did the sinus pain start?\"  (e.g., hours, days)       About two weeks  3. SEVERITY: \"How bad is the pain?\"   (Scale 1-10; mild, moderate or severe)    - MILD (1-3): doesn't interfere with normal activities     - MODERATE (4-7): interferes with normal activities (e.g., work or school) or awakens from sleep    - SEVERE (8-10): excruciating pain and patient unable to do any normal activities         Moderate to severe  4. RECURRENT SYMPTOM: \"Have you ever had sinus problems before?\" If so, ask: \"When was the last time?\" and \"What happened that time?\"       Yes, hx of sinus infections  5. NASAL CONGESTION: \"Is the nose blocked?\" If so, ask, \"Can you open it or must you breathe through the mouth?\"      Have to blow nose first, then can breathe through nose  6. NASAL DISCHARGE: \"Do you have discharge from your nose?\" If so ask, \"What color?\"      Green yellowish and bloody  7. FEVER: \"Do you have a fever?\" If so, ask: \"What is " "it, how was it measured, and when did it start?\"       Unsure, felt feverish yesterday  8. OTHER SYMPTOMS: \"Do you have any other symptoms?\" (e.g., sore throat, cough, earache, difficulty breathing)      cough  9. PREGNANCY: \"Is there any chance you are pregnant?\" \"When was your last menstrual period?\"      no    Protocols used: Sinus Pain and Congestion-A-[place]    "

## 2024-12-09 NOTE — TELEPHONE ENCOUNTER
Patient notified of providers advice and appointment made.    Ev Banegas RN on 12/9/2024 at 9:00 AM

## 2025-04-08 ENCOUNTER — TELEPHONE (OUTPATIENT)
Dept: DERMATOLOGY | Facility: CLINIC | Age: 80
End: 2025-04-08
Payer: COMMERCIAL

## 2025-04-08 NOTE — TELEPHONE ENCOUNTER
Online Appointment Request     [facility]  Phone Message      Reason for Call: Appointment Intake     Patients Requests:  Appointment Preferences  Reason for appointment  Scalp psorisis  Preferred Provider  Rica Hairston  Preferred Location  [place]  Preferred Visit Type  In-person          Action taken: Other: none; Documenting patient was called; detail message was left for patient to call back.

## 2025-04-20 ENCOUNTER — HEALTH MAINTENANCE LETTER (OUTPATIENT)
Age: 80
End: 2025-04-20

## 2025-06-17 ENCOUNTER — OFFICE VISIT (OUTPATIENT)
Dept: ORTHOPEDICS | Facility: CLINIC | Age: 80
End: 2025-06-17
Payer: COMMERCIAL

## 2025-06-17 ENCOUNTER — ANCILLARY PROCEDURE (OUTPATIENT)
Dept: GENERAL RADIOLOGY | Facility: CLINIC | Age: 80
End: 2025-06-17
Attending: PHYSICIAN ASSISTANT
Payer: COMMERCIAL

## 2025-06-17 VITALS — WEIGHT: 167 LBS | HEIGHT: 65 IN | TEMPERATURE: 97.1 F | BODY MASS INDEX: 27.82 KG/M2

## 2025-06-17 DIAGNOSIS — R10.31 RIGHT GROIN PAIN: ICD-10-CM

## 2025-06-17 DIAGNOSIS — S73.191A TEAR OF RIGHT ACETABULAR LABRUM, INITIAL ENCOUNTER: Primary | ICD-10-CM

## 2025-06-17 PROCEDURE — 1125F AMNT PAIN NOTED PAIN PRSNT: CPT | Performed by: PHYSICIAN ASSISTANT

## 2025-06-17 PROCEDURE — 99204 OFFICE O/P NEW MOD 45 MIN: CPT | Performed by: PHYSICIAN ASSISTANT

## 2025-06-17 RX ORDER — CETIRIZINE HYDROCHLORIDE 10 MG/1
10 TABLET ORAL DAILY
COMMUNITY

## 2025-06-17 RX ORDER — VITAMIN B COMPLEX
TABLET ORAL DAILY
COMMUNITY

## 2025-06-17 ASSESSMENT — PAIN SCALES - GENERAL: PAINLEVEL_OUTOF10: MODERATE PAIN (5)

## 2025-06-17 NOTE — PROGRESS NOTES
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  She enjoys walking and playing cards and cribbage and also has her family close.  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      She is being seen for   Chief Complaint   Patient presents with    Musculoskeletal Problem     Right groin    Consult         History of Present Illness:   Mechanism of Injury: No injury fall or trauma  Location: Right groin  Duration of Pain: For approximately 4 months but intermittent issues although getting worse now  Rating of Pain: 5 out of 10  Pain Quality: Sharp groin pain  Pain is better with: Not walking  Pain is worse with: Ambulating with external rotation  Treatment so far consists of: Ibuprofen 800 mg and seat which helped.  Has tried Biofreeze that maybe has not helped.  Has not had any injections or therapy.   Associated Features: Patient has noted intermittent sharp groin pain associated with external rotation.  The pain is not activity orientated.  Patient had an incident recently when sitting and tried to stand where the hip got stuck and then had extreme pain and difficulty putting full weight on it but this got better later the next day.  Prior history of related problems: No previous surgery or trauma or fractures on this right hip  Pain is Limiting: Distance of ambulation when it happens  Here to: Orthopedic consultation  The Pain Has: Become more constant  Additional History: Patient has had an ablation back in 2021 for her back.  Her back does not seem to be a problem as she has no radicular symptoms or any back pain now      Patient's past medical, surgical, social and family histories reviewed.     Past Medical History:   Diagnosis Date    Asthma, mild intermittent 2004    allergy related, spring and fall    Hemangioma NOS     of the liver    Menopause age  48 or so    On HRT up until 2004.     Nonspecific elevation of levels of transaminase or lactic acid dehydrogenase (LDH)     while on Lipitor in 2004. Normal since    Osteoarthritis     Psoriasis     mild    Pure hypercholesterolemia         Past Surgical History:   Procedure Laterality Date    CYSTOURETHROSCOPY  08/31/65    FLEXIBLE SIGMOIDOSCOPY  1997    HC TOOTH EXTRACTION W/FORCEP      wisdom tooth extraction    SURGICAL HISTORY OF -   1987    exc skin denig<5mm reaminder body-thymus cyst removal    TONSILLECTOMY      TUBAL LIGATION  1981    ZZHC COLONOSCOPY W/WO BRUSH/WASH  10/27/04       Medications:  Current Outpatient Medications   Medication Sig Dispense Refill    albuterol (PROAIR HFA) 108 (90 BASE) MCG/ACT inhaler Inhale 1-2 puffs into the lungs every 4 hours as needed for shortness of breath / dyspnea. 1 Inhaler 3    aspirin 81 MG EC tablet Take 1 tablet by mouth daily.      BIOTIN PO Take by mouth.      cetirizine (ZYRTEC) 10 MG tablet Take 10 mg by mouth daily.      omeprazole (PRILOSEC) 20 MG DR capsule Take 1 capsule (20 mg) by mouth daily. 90 capsule 3    rosuvastatin (CRESTOR) 10 MG tablet Take 1 tablet (10 mg) by mouth daily. 90 tablet 3    traZODone (DESYREL) 50 MG tablet Take 1 tablet (50 mg) by mouth at bedtime. 90 tablet 3    Vitamin D3 (VITAMIN D, CHOLECALCIFEROL,) 25 mcg (1000 units) tablet Take by mouth daily.      cefdinir (OMNICEF) 300 MG capsule Take 1 capsule (300 mg) by mouth 2 times daily. (Patient not taking: Reported on 6/17/2025) 20 capsule 0    fluticasone (FLOVENT HFA) 110 MCG/ACT inhaler Inhale 1 puff into the lungs 2 times daily (Patient not taking: Reported on 6/17/2025)      predniSONE (DELTASONE) 20 MG tablet Take  2 tabs daily x 3 days, then 1 tab daily x 3 days, (Patient not taking: Reported on 6/17/2025) 9 tablet 0     No current facility-administered medications for this visit.       Allergies   Allergen Reactions    Amoxicillin        Social History     Occupational  "History     Employer: RETIRED   Tobacco Use    Smoking status: Never    Smokeless tobacco: Never   Substance and Sexual Activity    Alcohol use: Yes     Comment: occ    Drug use: No    Sexual activity: Yes     Partners: Male       Family History   Problem Relation Age of Onset    Hypertension Father     Respiratory Father         emphysema    Cancer - colorectal Mother     Cancer Mother         ovarian    Cerebrovascular Disease Mother     C.A.D. Father     Arthritis Mother     Alzheimer Disease Mother     Heart Disease Brother         1/2 brother by mom, heart disease    Cancer Brother         1/2 brother, lung cancer    Lipids Brother        REVIEW OF SYSTEMS  10 point review systems performed otherwise negative as noted as per history of present illness.    Physical Exam:  Vitals: Temp 97.1  F (36.2  C) (Temporal)   Ht 1.638 m (5' 4.5\")   Wt 75.8 kg (167 lb)   BMI 28.22 kg/m    BMI= Body mass index is 28.22 kg/m .    Constitutional: healthy, alert and no acute distress   Psychiatric: mentation appears normal and affect normal/bright  NEURO: no focal deficits, CMS intact right lower extremity   RESP: Normal with easy respirations and no use of accessory muscles to breathe, no audible wheezing or retractions  CV: Calf soft and nontender to palpation, leg warm   SKIN: No erythema, rashes, excoriation, or breakdown. No evidence of infection of the skin I see today.  MUSCULOSKELETAL:  INSPECTION of right hip: No gross deformities  PALPATION: no tenderness over the lateral hip and greater trochanteric region, thigh or lower leg.   ROM: Passive: Flexion to 120 degrees, internal rotation to 35 degrees, external rotation to 80 degrees.  All range of motion without catching locking or pain.     STRENGTH: 5 out of 5 hip flexion, quad and hamstring, abductors and abductors without pain.   SPECIAL TEST: Negative Lloyd's maneuver, negative FADIR maneuver, negative Scour maneuver, negative Jaxon's test.   SPECIAL TEST " SPINE: Patient has no tenderness on palpation of cervical/thoracic/lumbar spine or SI joints. Negative straight leg raise to 90 . Negative Lloyd's maneuver.  GAIT: non-antalgic  Lymph: no palpable lymph nodes    Diagnostic Modalities:  X-rays done today showing very slight joint space narrowing in the inferior portion of the hip and we do see mild joint space narrowing on the left hip but no fracture dislocation or tumor and no cam or pincer deformity is noted.    Independent visualization of the images was performed.    Impression: 1.  Suspect possible right hip labral tear/pathology  2.  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    FOCUSED PLAN:  We decided to proceed with a right hip MRI arthrogram secondary to the suspicion of possible right hip labral tear.  X-ray shows that the joint spacing is not all that narrow and her presentation is intermittent sharp pain that goes away quickly at times.  We did discuss that even if we find a labral tear we could show it to our hip specialist but might be unlikely that he would proceed with a hip arthroscopy because of age and if there is the presence of chondromalacia or osteoarthritis that we do not see on x-ray.  We did discuss watching the condition for another month to see if the sharp pains continue.  We also discussed steroid injection with ultrasound guidance but we decided to proceed with the MRI arthrogram to get more information and then follow-up virtually after MRI arthrogram is done.    Re-x-ray on return: No      This note was dictated with [employer].    Neftali Yadav PA-C

## 2025-06-17 NOTE — LETTER
6/17/2025      Mattie Andrew  [address]  [facility] [address]      Dear Colleague,    Thank you for referring your patient, Mattie Andrew, to the [facility]. Please see a copy of my visit note below.    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  She enjoys walking and playing cards and cribbage and also has her family close.  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      She is being seen for   Chief Complaint   Patient presents with     Musculoskeletal Problem     Right groin     Consult         History of Present Illness:   Mechanism of Injury: No injury fall or trauma  Location: Right groin  Duration of Pain: For approximately 4 months but intermittent issues although getting worse now  Rating of Pain: 5 out of 10  Pain Quality: Sharp groin pain  Pain is better with: Not walking  Pain is worse with: Ambulating with external rotation  Treatment so far consists of: Ibuprofen 800 mg and seat which helped.  Has tried Biofreeze that maybe has not helped.  Has not had any injections or therapy.   Associated Features: Patient has noted intermittent sharp groin pain associated with external rotation.  The pain is not activity orientated.  Patient had an incident recently when sitting and tried to stand where the hip got stuck and then had extreme pain and difficulty putting full weight on it but this got better later the next day.  Prior history of related problems: No previous surgery or trauma or fractures on this right hip  Pain is Limiting: Distance of ambulation when it happens  Here to: Orthopedic consultation  The Pain Has: Become more constant  Additional History: Patient has had an ablation back in 2021 for her back.  Her back does not seem to be a problem as she has no radicular symptoms or any back pain  now      Patient's past medical, surgical, social and family histories reviewed.     Past Medical History:   Diagnosis Date     Asthma, mild intermittent 2004    allergy related, spring and fall     Hemangioma NOS     of the liver     Menopause age 48 or so    On HRT up until 2004.      Nonspecific elevation of levels of transaminase or lactic acid dehydrogenase (LDH)     while on Lipitor in 2004. Normal since     Osteoarthritis      Psoriasis     mild     Pure hypercholesterolemia         Past Surgical History:   Procedure Laterality Date     CYSTOURETHROSCOPY  08/31/65     FLEXIBLE SIGMOIDOSCOPY  1997     HC TOOTH EXTRACTION W/FORCEP      wisdom tooth extraction     SURGICAL HISTORY OF -   1987    exc skin denig<5mm reaminder body-thymus cyst removal     TONSILLECTOMY       TUBAL LIGATION  1981     ZZHC COLONOSCOPY W/WO BRUSH/WASH  10/27/04       Medications:  Current Outpatient Medications   Medication Sig Dispense Refill     albuterol (PROAIR HFA) 108 (90 BASE) MCG/ACT inhaler Inhale 1-2 puffs into the lungs every 4 hours as needed for shortness of breath / dyspnea. 1 Inhaler 3     aspirin 81 MG EC tablet Take 1 tablet by mouth daily.       BIOTIN PO Take by mouth.       cetirizine (ZYRTEC) 10 MG tablet Take 10 mg by mouth daily.       omeprazole (PRILOSEC) 20 MG DR capsule Take 1 capsule (20 mg) by mouth daily. 90 capsule 3     rosuvastatin (CRESTOR) 10 MG tablet Take 1 tablet (10 mg) by mouth daily. 90 tablet 3     traZODone (DESYREL) 50 MG tablet Take 1 tablet (50 mg) by mouth at bedtime. 90 tablet 3     Vitamin D3 (VITAMIN D, CHOLECALCIFEROL,) 25 mcg (1000 units) tablet Take by mouth daily.       cefdinir (OMNICEF) 300 MG capsule Take 1 capsule (300 mg) by mouth 2 times daily. (Patient not taking: Reported on 6/17/2025) 20 capsule 0     fluticasone (FLOVENT HFA) 110 MCG/ACT inhaler Inhale 1 puff into the lungs 2 times daily (Patient not taking: Reported on 6/17/2025)       predniSONE (DELTASONE) 20 MG tablet  "Take  2 tabs daily x 3 days, then 1 tab daily x 3 days, (Patient not taking: Reported on 6/17/2025) 9 tablet 0     No current facility-administered medications for this visit.       Allergies   Allergen Reactions     Amoxicillin        Social History     Occupational History     Employer: RETIRED   Tobacco Use     Smoking status: Never     Smokeless tobacco: Never   Substance and Sexual Activity     Alcohol use: Yes     Comment: occ     Drug use: No     Sexual activity: Yes     Partners: Male       Family History   Problem Relation Age of Onset     Hypertension Father      Respiratory Father         emphysema     Cancer - colorectal Mother      Cancer Mother         ovarian     Cerebrovascular Disease Mother      C.A.D. Father      Arthritis Mother      Alzheimer Disease Mother      Heart Disease Brother         1/2 brother by mom, heart disease     Cancer Brother         1/2 brother, lung cancer     Lipids Brother        REVIEW OF SYSTEMS  10 point review systems performed otherwise negative as noted as per history of present illness.    Physical Exam:  Vitals: Temp 97.1  F (36.2  C) (Temporal)   Ht 1.638 m (5' 4.5\")   Wt 75.8 kg (167 lb)   BMI 28.22 kg/m    BMI= Body mass index is 28.22 kg/m .    Constitutional: healthy, alert and no acute distress   Psychiatric: mentation appears normal and affect normal/bright  NEURO: no focal deficits, CMS intact right lower extremity   RESP: Normal with easy respirations and no use of accessory muscles to breathe, no audible wheezing or retractions  CV: Calf soft and nontender to palpation, leg warm   SKIN: No erythema, rashes, excoriation, or breakdown. No evidence of infection of the skin I see today.  MUSCULOSKELETAL:  INSPECTION of right hip: No gross deformities  PALPATION: no tenderness over the lateral hip and greater trochanteric region, thigh or lower leg.   ROM: Passive: Flexion to 120 degrees, internal rotation to 35 degrees, external rotation to 80 degrees.  All " range of motion without catching locking or pain.     STRENGTH: 5 out of 5 hip flexion, quad and hamstring, abductors and abductors without pain.   SPECIAL TEST: Negative Lloyd's maneuver, negative FADIR maneuver, negative Scour maneuver, negative Jaxon's test.   SPECIAL TEST SPINE: Patient has no tenderness on palpation of cervical/thoracic/lumbar spine or SI joints. Negative straight leg raise to 90 . Negative Lloyd's maneuver.  GAIT: non-antalgic  Lymph: no palpable lymph nodes    Diagnostic Modalities:  X-rays done today showing very slight joint space narrowing in the inferior portion of the hip and we do see mild joint space narrowing on the left hip but no fracture dislocation or tumor and no cam or pincer deformity is noted.    Independent visualization of the images was performed.    Impression: 1.  Suspect possible right hip labral tear/pathology  2.  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    FOCUSED PLAN:  We decided to proceed with a right hip MRI arthrogram secondary to the suspicion of possible right hip labral tear.  X-ray shows that the joint spacing is not all that narrow and her presentation is intermittent sharp pain that goes away quickly at times.  We did discuss that even if we find a labral tear we could show it to our hip specialist but might be unlikely that he would proceed with a hip arthroscopy because of age and if there is the presence of chondromalacia or osteoarthritis that we do not see on x-ray.  We did discuss watching the condition for another month to see if the sharp pains continue.  We also discussed steroid injection with ultrasound guidance but we decided to proceed with the MRI arthrogram to get more information and then follow-up virtually after MRI arthrogram is done.    Re-x-ray on return: No      This note was dictated with [employer].    Neftali Yadav PA-C       Again, thank you for allowing me to participate in the care of your patient.        Sincerely,        Neftali Yadav PA-C    Electronically signed

## 2025-07-16 ENCOUNTER — HOSPITAL ENCOUNTER (OUTPATIENT)
Dept: GENERAL RADIOLOGY | Facility: CLINIC | Age: 80
Discharge: HOME | End: 2025-07-16
Attending: PHYSICIAN ASSISTANT
Payer: MEDICARE

## 2025-07-16 ENCOUNTER — HOSPITAL ENCOUNTER (OUTPATIENT)
Dept: MRI IMAGING | Facility: CLINIC | Age: 80
Discharge: HOME | End: 2025-07-16
Attending: PHYSICIAN ASSISTANT
Payer: MEDICARE

## 2025-07-16 DIAGNOSIS — S73.191A TEAR OF RIGHT ACETABULAR LABRUM, INITIAL ENCOUNTER: ICD-10-CM

## 2025-07-16 PROCEDURE — A9585 GADOBUTROL INJECTION: HCPCS | Performed by: RADIOLOGY

## 2025-07-16 PROCEDURE — 77002 NEEDLE LOCALIZATION BY XRAY: CPT

## 2025-07-16 PROCEDURE — 73722 MRI JOINT OF LWR EXTR W/DYE: CPT | Mod: RT

## 2025-07-16 PROCEDURE — 250N000009 HC RX 250: Performed by: RADIOLOGY

## 2025-07-16 PROCEDURE — 250N000011 HC RX IP 250 OP 636: Performed by: RADIOLOGY

## 2025-07-16 PROCEDURE — 96372 THER/PROPH/DIAG INJ SC/IM: CPT | Performed by: RADIOLOGY

## 2025-07-16 PROCEDURE — 255N000002 HC RX 255 OP 636: Performed by: RADIOLOGY

## 2025-07-16 RX ORDER — IOPAMIDOL 510 MG/ML
100 INJECTION, SOLUTION INTRAVASCULAR ONCE
Status: COMPLETED | OUTPATIENT
Start: 2025-07-16 | End: 2025-07-16

## 2025-07-16 RX ORDER — GADOBUTROL 604.72 MG/ML
0.1 INJECTION INTRAVENOUS ONCE
Status: COMPLETED | OUTPATIENT
Start: 2025-07-16 | End: 2025-07-16

## 2025-07-16 RX ORDER — EPINEPHRINE 1 MG/ML
0.1 INJECTION, SOLUTION, CONCENTRATE INTRAVENOUS ONCE
Status: COMPLETED | OUTPATIENT
Start: 2025-07-16 | End: 2025-07-16

## 2025-07-16 RX ORDER — BUPIVACAINE HYDROCHLORIDE 2.5 MG/ML
10 INJECTION, SOLUTION EPIDURAL; INFILTRATION; INTRACAUDAL; PERINEURAL ONCE
Status: COMPLETED | OUTPATIENT
Start: 2025-07-16 | End: 2025-07-16

## 2025-07-16 RX ORDER — LIDOCAINE HYDROCHLORIDE 10 MG/ML
5 INJECTION, SOLUTION EPIDURAL; INFILTRATION; INTRACAUDAL; PERINEURAL ONCE
Status: COMPLETED | OUTPATIENT
Start: 2025-07-16 | End: 2025-07-16

## 2025-07-16 RX ADMIN — LIDOCAINE HYDROCHLORIDE 5 ML: 10 INJECTION, SOLUTION EPIDURAL; INFILTRATION; INTRACAUDAL; PERINEURAL at 09:45

## 2025-07-16 RX ADMIN — BUPIVACAINE HYDROCHLORIDE 7 ML: 2.5 INJECTION, SOLUTION EPIDURAL; INFILTRATION; INTRACAUDAL; PERINEURAL at 09:48

## 2025-07-16 RX ADMIN — IOPAMIDOL 13 ML: 510 INJECTION, SOLUTION INTRAVASCULAR at 09:47

## 2025-07-16 RX ADMIN — GADOBUTROL 0.05 ML: 604.72 INJECTION INTRAVENOUS at 09:48

## 2025-07-16 RX ADMIN — EPINEPHRINE 0.1 ML: 1 INJECTION INTRAMUSCULAR; INTRAVENOUS; SUBCUTANEOUS at 09:48

## 2025-07-19 ASSESSMENT — HOOS JR
HOOS JR TOTAL INTERVAL SCORE: 85.26
GOING UP OR DOWN STAIRS: MILD
WALKING ON UNEVEN SURFACE: MILD

## 2025-07-21 ENCOUNTER — OFFICE VISIT (OUTPATIENT)
Dept: ORTHOPEDICS | Facility: CLINIC | Age: 80
End: 2025-07-21
Payer: COMMERCIAL

## 2025-07-21 VITALS — WEIGHT: 167 LBS | HEIGHT: 65 IN | BODY MASS INDEX: 27.82 KG/M2 | TEMPERATURE: 97.1 F

## 2025-07-21 DIAGNOSIS — S73.191D TEAR OF RIGHT ACETABULAR LABRUM, SUBSEQUENT ENCOUNTER: Primary | ICD-10-CM

## 2025-07-21 DIAGNOSIS — M94.251 CHONDROMALACIA OF RIGHT HIP: ICD-10-CM

## 2025-07-21 PROCEDURE — 1126F AMNT PAIN NOTED NONE PRSNT: CPT | Performed by: PHYSICIAN ASSISTANT

## 2025-07-21 PROCEDURE — 99213 OFFICE O/P EST LOW 20 MIN: CPT | Performed by: PHYSICIAN ASSISTANT

## 2025-07-21 ASSESSMENT — PAIN SCALES - GENERAL: PAINLEVEL_OUTOF10: NO PAIN (0)

## 2025-07-21 NOTE — LETTER
"7/21/2025      Mattie Andrew  [address]  [facility] [address]      Dear Colleague,    Thank you for referring your patient, Mattie Andrew, to the [facility]. Please see a copy of my visit note below.    Office Visit-Follow up    Chief Complaint: Mattie Andrew is a 79 year old female who is being seen for   Chief Complaint   Patient presents with     Results       History of Present Illness:   Previous Visit Plan:   [facility] PAC 6/17/2025:  We decided to proceed with a right hip MRI arthrogram secondary to the suspicion of possible right hip labral tear. X-ray shows that the joint spacing is not all that narrow and her presentation is intermittent sharp pain that goes away quickly at times. We did discuss that even if we find a labral tear we could show it to our hip specialist but might be unlikely that he would proceed with a hip arthroscopy because of age and if there is the presence of chondromalacia or osteoarthritis that we do not see on x-ray. We did discuss watching the condition for another month to see if the sharp pains continue. We also discussed steroid injection with ultrasound guidance but we decided to proceed with the MRI arthrogram to get more information and then follow-up virtually after MRI arthrogram is done.     Additional History: She states that her hip has not been bothering her and she actually has no pain right now.  At times she is worried she will have the pain she had before that was sharp but has not had it.    REVIEW OF SYSTEMS  Review of systems negative other than positive findings in HPI.    Physical Exam:  Vitals: Temp 97.1  F (36.2  C) (Temporal)   Ht 1.638 m (5' 4.5\")   Wt 75.8 kg (167 lb)   BMI 28.22 kg/m    BMI= Body mass index is 28.22 kg/m .  Constitutional: healthy, alert and no acute distress   Psychiatric: mentation appears normal and affect normal/bright  NEURO: no focal deficits, CMS intact right hip  RESP: Normal with easy " respirations and no use of accessory muscles to breathe, no audible wheezing or retractions  CV: Calf soft and nontender to palpation, leg warm   SKIN: No erythema, rashes, excoriation, or breakdown. No evidence of infection.   MUSCULOSKELETAL:  INSPECTION of bilateralright hip: No gross deformities  PALPATION: no tenderness over the lateral hip and greater trochanteric region, thigh or lower leg.   ROM: Passive: Flexion to 120 degrees, internal rotation to 35 degrees, external rotation to 80 degrees.  All range of motion without catching locking or pain.     STRENGTH: 5 out of 5 hip flexion, quad and hamstring without pain.   SPECIAL TEST: Negative Lloyd's maneuver, negative FADIR maneuver, negative Scour maneuver, did not do Jaxon's test.   GAIT: non-antalgic  Lymph: no palpable lymph nodes      Diagnostic Modalities:  Recent Results (from the past 744 hours)   XR Hip Contrast CT/MR Injection Right    Narrative    PROCEDURE PERFORMED:  1. FLUOROSCOPIC-GUIDED INTRAARTICULAR GADOLINIUM INJECTION FOR DIAGNOSTIC PURPOSES.  2. INJECTION OF CONTRAST FOR ARTHROGRAM.  LOCATION: [facility]  DATE: 7/16/2025    INDICATION:  Tear of right acetabular labrum, initial encounter  COMPARISON: None.    RADIATION DOSE: Dose Area Product 19.76 microGy-m2    PROCEDURE: Risk and benefits of the procedure including, but not limited to, infection and bleeding, were discussed with the patient. The patient was prepped and draped in the usual sterile fashion. The skin overlying the joint was anesthetized with   approximately 5 mL of 1% lidocaine. Under fluoroscopic guidance, a 22-gauge needle was advanced into the joint. Position of the needle was confirmed with the use of approximately 2 mL Isovue-200 contrast. 12 mL of a mixture of 0.1 mL of Magnevist   gadolinium mixed with 20 mL of sterile contrast was then instilled into the joint. Patient was then transported to MRI for imaging. Patient tolerated the  procedure well.      Impression    IMPRESSION:  1. Successful intraarticular gadolinium injection into the right hip under fluoroscopic guidance.    Reference CPT Codes: 59821, 05484   MR Hip Arthrogram Right w Contrast    Narrative    EXAM: MR HIP ARTHROGRAM RIGHT W CONTRAST  LOCATION: [facility]  DATE: 7/16/2025    INDICATION:  Tear of right acetabular labrum, initial encounter  COMPARISON:  Radiograph 06/17/2025   TECHNIQUE: MR arthrogram protocol, obtained after administration of dilute gadolinium solution into the hip joint.    FINDINGS:    RIGHT HIP:  -Labrum: Discrete detached tear of the anterosuperior labrum at the chondrolabral junction (series 6 image 16 series 8 image 9-10). There is degeneration/degenerative tearing of the superior labrum.    -Cartilage: Mild likely grade II chondromalacia greatest of the superior hip. Minimal edema is present within the acetabulum.   -Joint space: No loose bodies.  -Joint capsule/ligaments: Intact joint capsule. Ligamentum teres is intact.   -Morphology: Alpha Angle is normal. No bony morphologic changes associated with femoroacetabular impingement.    MUSCLES AND TENDONS:   -Gluteal: Gluteal tendons are intact without significant tendinopathy. Mild edema is present within the greater trochanteric bursa.   -Proximal hamstring: Mild hamstring origin tendinopathy. No tear.    -Iliopsoas: Intact without significant tendinopathy.   -Rectus femoris origin: No tear or tendinopathy.    BONES:   -No fracture or concerning marrow replacing lesion.    SOFT TISSUES:  -Normal muscle bulk. Iatrogenic fluid within the muscles and subcutaneous tissue anterior to the hip. No evidence of acute muscular injury.     INTRAPELVIC CONTENTS:   -Colonic diverticulosis without focal inflammation.       Impression    IMPRESSION:  1.  No fracture. No edema to suggest contusion or stress reaction.  2.  Mild right hip chondromalacia.  3.  Discrete detached tear of  the anterosuperior labrum at the chondrolabral junction with degeneration/degenerative tearing of the superior labrum.  4.  Mild right hamstring origin tendinopathy without tear.       I agree with the above reading     Independent visualization of the images was performed.      Impression: 1.  Right hip superior labral tear   2.  Right hip chondromalacia    Plan:  All of the above pertinent physical exam and imaging modalities findings was reviewed with [place].    FOCUSED PLAN:   Discussed MRI with the patient and the results.  The fact that she is not having any pain anymore makes me feel that her original pain was a labral tear that was being pinched and now the labrum is not in the joint thus not creating pain.  She does have some chondromalacia which we discussed.  We decided to have her continue activity as tolerated but be cautious and use handrails if needed.  If she started to have pain again I would suggest a ultrasound-guided intra-articular hip injection by Dr. Heredia and physical therapy to work on labral tear.  We did discuss referral to Lloyd Kahn who does hip arthroscopy however I did notify her that most likely with her age and having some arthritis in the hip I would doubt that he would proceed with an arthroscopy.  She understands.  Follow-up as needed.    Re-x-ray on return: No      This note was dictated with [place] [employer].    Neftali Yadav PA-C      Again, thank you for allowing me to participate in the care of your patient.        Sincerely,        Neftali Yadav PA-C    Electronically signed

## 2025-07-21 NOTE — PROGRESS NOTES
"Office Visit-Follow up    Chief Complaint: Mattie Andrew is a 79 year old female who is being seen for   Chief Complaint   Patient presents with    Results       History of Present Illness:   Previous Visit Plan:   Leif LIZARRAGA 6/17/2025:  We decided to proceed with a right hip MRI arthrogram secondary to the suspicion of possible right hip labral tear. X-ray shows that the joint spacing is not all that narrow and her presentation is intermittent sharp pain that goes away quickly at times. We did discuss that even if we find a labral tear we could show it to our hip specialist but might be unlikely that he would proceed with a hip arthroscopy because of age and if there is the presence of chondromalacia or osteoarthritis that we do not see on x-ray. We did discuss watching the condition for another month to see if the sharp pains continue. We also discussed steroid injection with ultrasound guidance but we decided to proceed with the MRI arthrogram to get more information and then follow-up virtually after MRI arthrogram is done.     Additional History: She states that her hip has not been bothering her and she actually has no pain right now.  At times she is worried she will have the pain she had before that was sharp but has not had it.    REVIEW OF SYSTEMS  Review of systems negative other than positive findings in HPI.    Physical Exam:  Vitals: Temp 97.1  F (36.2  C) (Temporal)   Ht 1.638 m (5' 4.5\")   Wt 75.8 kg (167 lb)   BMI 28.22 kg/m    BMI= Body mass index is 28.22 kg/m .  Constitutional: healthy, alert and no acute distress   Psychiatric: mentation appears normal and affect normal/bright  NEURO: no focal deficits, CMS intact right hip  RESP: Normal with easy respirations and no use of accessory muscles to breathe, no audible wheezing or retractions  CV: Calf soft and nontender to palpation, leg warm   SKIN: No erythema, rashes, excoriation, or breakdown. No evidence of infection. "   MUSCULOSKELETAL:  INSPECTION of bilateralright hip: No gross deformities  PALPATION: no tenderness over the lateral hip and greater trochanteric region, thigh or lower leg.   ROM: Passive: Flexion to 120 degrees, internal rotation to 35 degrees, external rotation to 80 degrees.  All range of motion without catching locking or pain.     STRENGTH: 5 out of 5 hip flexion, quad and hamstring without pain.   SPECIAL TEST: Negative Lloyd's maneuver, negative FADIR maneuver, negative Scour maneuver, did not do Jaxon's test.   GAIT: non-antalgic  Lymph: no palpable lymph nodes      Diagnostic Modalities:  Recent Results (from the past 744 hours)   XR Hip Contrast CT/MR Injection Right    Narrative    PROCEDURE PERFORMED:  1. FLUOROSCOPIC-GUIDED INTRAARTICULAR GADOLINIUM INJECTION FOR DIAGNOSTIC PURPOSES.  2. INJECTION OF CONTRAST FOR ARTHROGRAM.  LOCATION: [facility]  DATE: 7/16/2025    INDICATION:  Tear of right acetabular labrum, initial encounter  COMPARISON: None.    RADIATION DOSE: Dose Area Product 19.76 microGy-m2    PROCEDURE: Risk and benefits of the procedure including, but not limited to, infection and bleeding, were discussed with the patient. The patient was prepped and draped in the usual sterile fashion. The skin overlying the joint was anesthetized with   approximately 5 mL of 1% lidocaine. Under fluoroscopic guidance, a 22-gauge needle was advanced into the joint. Position of the needle was confirmed with the use of approximately 2 mL Isovue-200 contrast. 12 mL of a mixture of 0.1 mL of Magnevist   gadolinium mixed with 20 mL of sterile contrast was then instilled into the joint. Patient was then transported to MRI for imaging. Patient tolerated the procedure well.      Impression    IMPRESSION:  1. Successful intraarticular gadolinium injection into the right hip under fluoroscopic guidance.    Reference CPT Codes: 68048, 78292   MR Hip Arthrogram Right w Contrast     Narrative    EXAM: MR HIP ARTHROGRAM RIGHT W CONTRAST  LOCATION: [facility]  DATE: 7/16/2025    INDICATION:  Tear of right acetabular labrum, initial encounter  COMPARISON:  Radiograph 06/17/2025   TECHNIQUE: MR arthrogram protocol, obtained after administration of dilute gadolinium solution into the hip joint.    FINDINGS:    RIGHT HIP:  -Labrum: Discrete detached tear of the anterosuperior labrum at the chondrolabral junction (series 6 image 16 series 8 image 9-10). There is degeneration/degenerative tearing of the superior labrum.    -Cartilage: Mild likely grade II chondromalacia greatest of the superior hip. Minimal edema is present within the acetabulum.   -Joint space: No loose bodies.  -Joint capsule/ligaments: Intact joint capsule. Ligamentum teres is intact.   -Morphology: Alpha Angle is normal. No bony morphologic changes associated with femoroacetabular impingement.    MUSCLES AND TENDONS:   -Gluteal: Gluteal tendons are intact without significant tendinopathy. Mild edema is present within the greater trochanteric bursa.   -Proximal hamstring: Mild hamstring origin tendinopathy. No tear.    -Iliopsoas: Intact without significant tendinopathy.   -Rectus femoris origin: No tear or tendinopathy.    BONES:   -No fracture or concerning marrow replacing lesion.    SOFT TISSUES:  -Normal muscle bulk. Iatrogenic fluid within the muscles and subcutaneous tissue anterior to the hip. No evidence of acute muscular injury.     INTRAPELVIC CONTENTS:   -Colonic diverticulosis without focal inflammation.       Impression    IMPRESSION:  1.  No fracture. No edema to suggest contusion or stress reaction.  2.  Mild right hip chondromalacia.  3.  Discrete detached tear of the anterosuperior labrum at the chondrolabral junction with degeneration/degenerative tearing of the superior labrum.  4.  Mild right hamstring origin tendinopathy without tear.       I agree with the above reading      Independent visualization of the images was performed.      Impression: 1.  Right hip superior labral tear   2.  Right hip chondromalacia    Plan:  All of the above pertinent physical exam and imaging modalities findings was reviewed with Mattie.    FOCUSED PLAN:   Discussed MRI with the patient and the results.  The fact that she is not having any pain anymore makes me feel that her original pain was a labral tear that was being pinched and now the labrum is not in the joint thus not creating pain.  She does have some chondromalacia which we discussed.  We decided to have her continue activity as tolerated but be cautious and use handrails if needed.  If she started to have pain again I would suggest a ultrasound-guided intra-articular hip injection by Dr. Heredia and physical therapy to work on labral tear.  We did discuss referral to Lloyd Kahn who does hip arthroscopy however I did notify her that most likely with her age and having some arthritis in the hip I would doubt that he would proceed with an arthroscopy.  She understands.  Follow-up as needed.    Re-x-ray on return: No      This note was dictated with [employer].    Neftali Yadav PA-C

## 2025-08-17 DIAGNOSIS — G47.00 INSOMNIA, UNSPECIFIED TYPE: ICD-10-CM

## 2025-08-18 RX ORDER — TRAZODONE HYDROCHLORIDE 50 MG/1
50 TABLET ORAL AT BEDTIME
Qty: 90 TABLET | Refills: 0 | Status: SHIPPED | OUTPATIENT
Start: 2025-08-18

## 2025-08-24 DIAGNOSIS — K21.00 GASTROESOPHAGEAL REFLUX DISEASE WITH ESOPHAGITIS WITHOUT HEMORRHAGE: ICD-10-CM

## 2025-08-25 RX ORDER — OMEPRAZOLE 20 MG/1
20 CAPSULE, DELAYED RELEASE ORAL DAILY
Qty: 30 CAPSULE | Refills: 0 | Status: SHIPPED | OUTPATIENT
Start: 2025-08-25